# Patient Record
(demographics unavailable — no encounter records)

---

## 2017-11-08 NOTE — EDM.PDOC
ED HPI GENERAL MEDICAL PROBLEM





- General


Chief Complaint: Abdominal Pain


Stated Complaint: STOMACH PAIN


Time Seen by Provider: 11/08/17 19:47


Source of Information: Reports: Patient


History Limitations: Reports: No Limitations





- History of Present Illness


INITIAL COMMENTS - FREE TEXT/NARRATIVE: 





PEDS HISTORY AND PHYSICAL:





History of present illness:


Patient is an 8-year-old female who presents to the emergency room today with 

complaints of generalized abdominal pain which started approximately 2 hours 

prior to arrival. She denies any nausea, vomiting, diarrhea. Patient is resting 

on the cot and appears uncomfortable as she is tossing and turning and grabbing 

at her stomach. She points to the umbilicus and then moves her hand around her 

stomach at the location of her abdominal pain. She has had a bowel movement but 

is unsure of when she last had a bowel movement. Denies any dysuria.





Review of systems: 


As per history of present illness and below otherwise all systems reviewed and 

negative.





Past medical history: 


As per history of present illness and as reviewed below otherwise 

noncontributory.





Surgical history: 


As per history of present illness and as reviewed below otherwise 

noncontributory.





Social history: 


No reported history of drug or alcohol abuse.





Family history: 


As per history of present illness and as reviewed below otherwise 

noncontributory.





Physical exam:


Gen.: Nontoxic appearing 8-year-old female. Able to speak in full sentences 

without shortness of breath. Alert and oriented


HEENT: Atraumatic, normocephalic, pupils reactive, negative for conjunctival 

pallor or scleral icterus, mucous membranes moist, throat clear, neck supple, 

nontender, trachea midline.  TMs normal bilaterally, no cervical adenopathy or 

nuchal rigidity.  


Lungs: Clear to auscultation, breath sounds equal bilaterally, chest nontender.


Heart: S1S2, regular rate and rhythm, no overt murmurs


Abdomen: Semifirm, nondistended, diffuse tenderness throughout all 4 quadrants. 

Negative for masses or hepatosplenomegaly. Normal abdominal bowel sounds.  


Pelvis: Stable nontender.


Genitourinary: Deferred.


Rectal: Deferred.


Extremities: Atraumatic, full range of motion without defects or deficits. 

Neurovascular unremarkable.


Neuro: Awake, alert, and age appropriate. Cranial nerves II through XII 

unremarkable. Cerebellum unremarkable. Motor and sensory unremarkable 

throughout. Exam nonfocal.


Skin:  Normal turgor, no overt rash or lesions





CT shows retaining stool involving the ascending colon, with a normal appendix. 

After receiving her IV fluid she does state she does feel better. Offered to 

provide intervention through the ER to help clear up her colon, patient and 

father declined at this time. Stating they would like to take medication at 

home. I would like her to use MiraLAX over-the-counter one to 3 times over the 

next 24 hours and then once daily for the next 5-7 days. Encouraged patient to 

start drinking more water/fluids she did appear to be mildly dehydrated upon 

arrival. Both patient and father are agreeable to plan of care denies any 

further questions.





Diagnostics:


CBC, CMP, UA, abdomen and pelvis CT





Therapeutics:


IV fluid





Impression: 


Abdominal pain


Constipation





Plan:


1. Please take MiraLAX one capful in 4-6 ounces of water or juice, allow it to 

dissolve completely, 1- 3x per day. After that, please take this medication 

once daily for the next 5-7 days. 


2. Encourage oral fluids to prevent dehydration. Did show that you need to be 

drinking more water.


3. Follow-up with your pediatrician in the next 1-2 days. Return to the ED as 

needed and as we discussed.





Definitive disposition and diagnosis as appropriate pending reevaluation and 

review of above.


Onset: Today


  ** Abdominal


Pain Score (Numeric/FACES): 10





- Related Data


 Allergies











Allergy/AdvReac Type Severity Reaction Status Date / Time


 


peanut Allergy  Vomiting Verified 11/08/17 19:42











Home Meds: 


 Home Meds





Albuterol Sulfate [Proair Hfa] 2 puff INH ASDIRECTED PRN 11/08/17 [History]











Past Medical History





- Past Health History


Medical/Surgical History: Denies Medical/Surgical History


Respiratory History: Reports: Asthma





- Past Surgical History


Respiratory Surgical History: Reports: None





Social & Family History





- Family History


Family Medical History: Noncontributory





- Tobacco Use


Second Hand Smoke Exposure: No





ED ROS GENERAL





- Review of Systems


Review Of Systems: ROS reveals no pertinent complaints other than HPI.





ED EXAM, GI/ABD





- Physical Exam


Exam: See Below (See dictation)





Course





- Vital Signs


Last Recorded V/S: 


 Last Vital Signs











Temp  35.8 C L  11/08/17 19:36


 


Pulse  104   11/08/17 19:36


 


Resp  20   11/08/17 19:36


 


BP  136/85 H  11/08/17 19:36


 


Pulse Ox  100   11/08/17 19:36














- Orders/Labs/Meds


Orders: 


 Active Orders 24 hr











 Category Date Time Status


 


 Abdomen Pelvis w Cont [CT] Stat Exams  11/08/17 19:56 Taken











Labs: 


 Laboratory Tests











  11/08/17 11/08/17 11/08/17 Range/Units





  20:18 20:18 20:45 


 


WBC  14.04 H    (4.0-13.5)  K/uL


 


RBC  4.28    (3.90-5.30)  M/uL


 


Hgb  12.8    (11.0-17.0)  g/dL


 


Hct  36.8    (36.0-45.0)  %


 


MCV  86.0    (68.0-87.0)  fL


 


MCH  29.9    (24.0-36.0)  pg


 


MCHC  34.8    (31.0-37.0)  g/dL


 


RDW Std Deviation  38.7    (28.0-62.0)  fl


 


RDW Coeff of Nic  12    (11.0-15.0)  %


 


Plt Count  301    (150-400)  K/uL


 


MPV  10.00    (7.40-12.00)  fL


 


Neut % (Auto)  78.7    (48.0-80.0)  %


 


Lymph % (Auto)  11.3 L    (16.0-40.0)  %


 


Mono % (Auto)  6.3    (0.0-15.0)  %


 


Eos % (Auto)  3.6    (0.0-7.0)  %


 


Baso % (Auto)  0.1    (0.0-1.5)  %


 


Neut # (Auto)  11.1 H    (1.4-5.7)  K/uL


 


Lymph # (Auto)  1.6    (0.6-2.4)  K/uL


 


Mono # (Auto)  0.9 H    (0.0-0.8)  K/uL


 


Eos # (Auto)  0.5    (0.0-0.8)  K/uL


 


Baso # (Auto)  0.0    (0.0-0.1)  K/uL


 


Nucleated RBC %  0.0    /100WBC


 


Nucleated RBCs #  0    K/uL


 


Sodium   141   (136-146)  mmol/L


 


Potassium   3.4 L   (3.5-5.1)  mmol/L


 


Chloride   108   ()  mmol/L


 


Carbon Dioxide   23   (21-31)  mmol/L


 


BUN   13   (6.0-23.0)  mg/dL


 


Creatinine   0.6   (0.6-1.5)  mg/dL


 


Est Cr Clr Drug Dosing   TNP   


 


Estimated GFR (MDRD)   TNP   


 


Glucose   188 H   ()  mg/dL


 


Calcium   9.6   (8.8-10.8)  mg/dL


 


Total Bilirubin   0.3   (0.1-1.5)  mg/dL


 


AST   47 H   (5-40)  IU/L


 


ALT   23   (8-54)  IU/L


 


Alkaline Phosphatase   176   (100-350)  


 


Total Protein   6.8   (6.0-8.0)  g/dL


 


Albumin   4.1   (3.8-5.4)  g/dL


 


Globulin   2.7   (2.0-3.5)  g/dL


 


Albumin/Globulin Ratio   1.5   (1.3-2.8)  


 


Urine Color    YELLOW  


 


Urine Appearance    CLEAR  


 


Urine pH    5.5  (5.0-8.0)  


 


Ur Specific Gravity    1.020  (1.001-1.035)  


 


Urine Protein    NEGATIVE  (NEGATIVE)  mg/dL


 


Urine Glucose (UA)    NEGATIVE  (NEGATIVE)  mg/dL


 


Urine Ketones    NEGATIVE  (NEGATIVE)  mg/dL


 


Urine Occult Blood    SMALL H  (NEGATIVE)  


 


Urine Nitrite    NEGATIVE  (NEGATIVE)  


 


Urine Bilirubin    NEGATIVE  (NEGATIVE)  


 


Urine Urobilinogen    0.2  (<2.0)  EU/dL


 


Ur Leukocyte Esterase    TRACE  (NEGATIVE)  


 


Urine RBC    0-2  (0-2/HPF)  


 


Urine WBC    1-3  (0-5/HPF)  


 


Ur Epithelial Cells    OCCASIONAL  (NONE-FEW)  


 


Urine Bacteria    FEW  (NEGATIVE)  











Meds: 


Medications














Discontinued Medications














Generic Name Dose Route Start Last Admin





  Trade Name Freq  PRN Reason Stop Dose Admin


 


Sodium Chloride  1,000 mls @ 999 mls/hr  11/08/17 19:56  11/08/17 21:27





  Normal Saline  IV  11/08/17 20:56  Infused





  STAT ONE   Infusion


 


Iopamidol  50 ml  11/08/17 20:53  11/08/17 20:54





  Isovue-300 (61%)  IVPUSH  11/08/17 20:54  50 ml





  ONETIME STA   Administration














Departure





- Departure


Time of Disposition: 22:03


Disposition: Home, Self-Care 01


Condition: Good


Clinical Impression: 


Constipation


Qualifiers:


 Constipation type: unspecified constipation type Qualified Code(s): K59.00 - 

Constipation, unspecified








- Discharge Information


Referrals: 


Christie Hamm MD [Primary Care Provider] - 


Forms:  ED Department Discharge


Additional Instructions: 


My general discharge


The following information is given to patients seen in the emergency department 

who are being discharged to home. This information is to outline your options 

for follow-up care. We provide all patients seen in our emergency department 

with a follow-up referral.





The need for follow-up, as well as the timing and circumstances, are variable 

depending upon the specifics of your emergency department visit.





If you don't have a primary care physician on staff, we will provide you with a 

referral. We always advise you to contact your personal physician following an 

emergency department visit to inform them of the circumstance of the visit and 

for follow-up with them and/or the need for any referrals to a consulting 

specialist.





The emergency department will also refer you to a specialist when appropriate. 

This referral assures that you have the opportunity for follow-up care with a 

specialist. All of these measure are taken in an effort to provide you with 

optimal care, which includes your follow-up.





Under all circumstances we always encourage you to contact your private 

physician who remains a resource for coordinating your care. When calling for 

follow-up care, please make the office aware that this follow-up is from your 

recent emergency room visit. If for any reason you are refused follow-up, 

please contact the [facility] Emergency 

Department at [phone] and asked to speak to the emergency department 

charge nurse.





[facility]


Primary Care - Pediatric Clinic


[address] [place] [id number]


Phone: [phone]


Fax: [phone]





1. Please take MiraLAX one capful in 4-6 ounces of water or juice, allow it to 

dissolve completely, 1- 3x per day. After that, please take Miralax just once 

daily for the next 5-7 days. 


2. Encourage oral fluids to prevent dehydration. Did show that you need to be 

drinking more water.


3. Follow-up with your pediatrician in the next 1-2 days. Return to the ED as 

needed and as we discussed.





- My Orders


Last 24 Hours: 


My Active Orders





11/08/17 19:56


Abdomen Pelvis w Cont [CT] Stat 














- Assessment/Plan


Last 24 Hours: 


My Active Orders





11/08/17 19:56


Abdomen Pelvis w Cont [CT] Stat

## 2017-11-09 NOTE — CT
EXAM DATE: 17



PATIENT'S AGE: 8



Patient: NAPOLEON EVANS



Facility: [place], [place]

Patient ID: [id number]

Site Patient ID: [id number].

Site Accession #: [id number].

: 2008

Study: CT Abdomen/Pelvis [id number]/8/2017 8:50:56 PM

Ordering Physician: Doctor Temp



Final Report: 

INDICATION:

Lower abdominal pain 



TECHNIQUE:

CT abdomen and pelvis acquired with IV contrast. 50 cc Isovue 300 



COMPARISON:

None 



FINDINGS:

Lower chest: Unremarkable. 

Liver: Unremarkable. 

Spleen: Unremarkable. 

Pancreas: Unremarkable. 

Gallbladder and bile ducts: Unremarkable. 

Kidneys: Unremarkable. 

Adrenal glands: Unremarkable. 

GI tract: Retained colonic stool involving the ascending colon. Appendix is 
normal. 

Vascular structures: Unremarkable. 

Lymph nodes: Unremarkable. 

Miscellaneous: Unremarkable. No free air or significant free fluid. 

Pelvic Organs: Unremarkable. 

Bones: Unremarkable for age. 



IMPRESSION:

Retained stool involving the ascending colon. 

Normal appendix.



Dictated by Leobardo Martinez MD @ 2017 9:35:20 PM





Dictated by: Leobardo Martinez MD @ 2017 21:35:26

(Electronic Signature)



Report Signed by Proxy.
[facility]

## 2018-04-17 NOTE — EDM.PDOC
ED HPI GENERAL MEDICAL PROBLEM





- General


Chief Complaint: Upper Extremity Injury/Pain


Stated Complaint: PT HURT LT ARM


Time Seen by Provider: 04/17/18 19:30


Source of Information: Reports: Patient


History Limitations: Reports: No Limitations





- History of Present Illness


INITIAL COMMENTS - FREE TEXT/NARRATIVE: 





Presents with her mother, crying. Mom states that she was not present but the 

child was reportedly twirling around with arms outstretched at dance class when 

she developed some left elbow pain. She did not fall or strike anything with 

her arm. No swelling, deformity left elbow.  


  ** left arm


Pain Score (Numeric/FACES): 8





- Related Data


 Allergies











Allergy/AdvReac Type Severity Reaction Status Date / Time


 


amoxicillin Allergy  Rash Verified 04/17/18 19:35


 


peanut Allergy  Vomiting Verified 04/17/18 19:35











Home Meds: 


 Home Meds





Albuterol Sulfate [Proair Hfa] 2 puff INH ASDIRECTED PRN 11/08/17 [History]











Past Medical History





- Past Health History


Medical/Surgical History: Denies Medical/Surgical History


HEENT History: Reports: None


Cardiovascular History: Reports: None


Respiratory History: Reports: Asthma


Gastrointestinal History: Reports: None


Genitourinary History: Reports: None


OB/GYN History: Reports: None


Musculoskeletal History: Reports: None


Neurological History: Reports: None


Psychiatric History: Reports: None


Endocrine/Metabolic History: Reports: None


Hematologic History: Reports: None


Immunologic History: Reports: None


Oncologic (Cancer) History: Reports: None


Dermatologic History: Reports: None





- Infectious Disease History


Infectious Disease History: Reports: None





- Past Surgical History


Head Surgeries/Procedures: Reports: None


Respiratory Surgical History: Reports: None





Social & Family History





- Family History


Family Medical History: Noncontributory





- Tobacco Use


Second Hand Smoke Exposure: No





Review of Systems





- Review of Systems


Review Of Systems: ROS reveals no pertinent complaints other than HPI.





ED EXAM, GENERAL





- Physical Exam


Exam: See Below


Exam Limited By: No Limitations


General Appearance: Alert, No Apparent Distress


Ears: Normal External Exam


Nose: Normal Inspection


Throat/Mouth: Normal Inspection


Head: Atraumatic, Normocephalic


Neck: Normal Inspection


Respiratory/Chest: No Respiratory Distress, Lungs Clear, Normal Breath Sounds


Cardiovascular: Regular Rate, Rhythm, No Murmur


GI/Abdominal: Soft


Back Exam: Normal Inspection


Extremities: Normal Inspection, Other (Full ROM left wrist and hand.  Left 

elbow without erythema, swelling but tender and holds in a flexed position.)


Neurological: Alert, Oriented


Psychiatric: Normal Affect, Normal Mood


Skin Exam: Warm, Dry, Intact, Normal Color, No Rash


Lymphatic: No Adenopathy





Course





- Vital Signs


Last Recorded V/S: 


 Last Vital Signs











Temp  36.1 C   04/17/18 19:35


 


Pulse  110   04/17/18 19:35


 


Resp  20   04/17/18 19:35


 


BP      


 


Pulse Ox  98   04/17/18 19:35














- Orders/Labs/Meds


Orders: 


 Active Orders 24 hr











 Category Date Time Status


 


 Elbow 2V Lt [CR] Stat Exams  04/17/18 19:37 Ordered











Meds: 


Medications














Discontinued Medications














Generic Name Dose Route Start Last Admin





  Trade Name Shahid  PRN Reason Stop Dose Admin


 


Acetaminophen  325 mg  04/17/18 19:39  04/17/18 19:44





  Tylenol  PO  04/17/18 19:40  325 mg





  NOW ONE   Administration





     





     





     





     


 


Hydrocodone Bitart/Acetaminophen  10 ml  04/17/18 20:13  04/17/18 20:23





  Acetaminophen/Hydrocodone 108-2.5 Mg/5 Ml  PO  04/17/18 20:14  10 ml





  ONETIME ONE   Administration





     





     





     





     














- Re-Assessments/Exams


Free Text/Narrative Re-Assessment/Exam: 





04/17/18 20:39


post mold splint





Departure





- Departure


Time of Disposition: 20:40


Disposition: Home, Self-Care 01


Condition: Good


Clinical Impression: 


Elbow injury


Qualifiers:


 Encounter type: initial encounter 








- Discharge Information


Referrals: 


Orthopedic Clinic [Outside]


Forms:  ED Department Discharge


Additional Instructions: 


1.  Watch fingers for coolness or blue ting--loosen ACE if needed.


2.  Call ortho clinic in am for FU


3.  Tylenol dosed for weight as needed for pain.





- My Orders


Last 24 Hours: 


My Active Orders





04/17/18 19:37


Elbow 2V Lt [CR] Stat 














- Assessment/Plan


Last 24 Hours: 


My Active Orders





04/17/18 19:37


Elbow 2V Lt [CR] Stat

## 2018-04-18 NOTE — CR
EXAM DATE: 18



PATIENT'S AGE: 9





Patient: NAPOLEON EVANS



Facility: [place], [place]

Patient ID: [id number]

Site Patient ID: [id number].

Site Accession #: [id number].

: 2008

Study: XRay Extremity [id number]/17/2018 8:06:13 PM

Ordering Physician: Doctor Temp



Final Report: 

HISTORY:

Elbow pain after dance class. Patient heard a pop with subsequent pain.



FINDINGS:

Two views of the left elbow demonstrate the patient is skeletally immature. No 
joint effusion is seen. There is questionable distraction of the lateral 
epicondyle ossification center. No acute fracture line is seen within the 
radius and ulna.



IMPRESSION:

Questionable displacement of the lateral epicondyle ossification center. 
Recommend correlation with point tenderness at this site. Consider MRI for 
complete evaluation.



Dictated by Vidya Cardoza MD @ 2018 8:21:18 PM







Dictated by: Vidya Cardoza MD @ 2018 20:21:36

(Electronic Signature)



Report Signed by Proxy.
ADDIE